# Patient Record
Sex: MALE | Race: WHITE | NOT HISPANIC OR LATINO | URBAN - METROPOLITAN AREA
[De-identification: names, ages, dates, MRNs, and addresses within clinical notes are randomized per-mention and may not be internally consistent; named-entity substitution may affect disease eponyms.]

---

## 2021-02-01 ENCOUNTER — EMERGENCY (EMERGENCY)
Facility: HOSPITAL | Age: 56
LOS: 0 days | Discharge: HOME | End: 2021-02-01
Attending: EMERGENCY MEDICINE | Admitting: EMERGENCY MEDICINE
Payer: MEDICAID

## 2021-02-01 VITALS
HEART RATE: 100 BPM | SYSTOLIC BLOOD PRESSURE: 198 MMHG | OXYGEN SATURATION: 99 % | RESPIRATION RATE: 18 BRPM | DIASTOLIC BLOOD PRESSURE: 99 MMHG | HEIGHT: 69 IN | TEMPERATURE: 99 F | WEIGHT: 182.1 LBS

## 2021-02-01 DIAGNOSIS — Z23 ENCOUNTER FOR IMMUNIZATION: ICD-10-CM
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
## 2021-02-01 DIAGNOSIS — S81.851A OPEN BITE, RIGHT LOWER LEG, INITIAL ENCOUNTER: ICD-10-CM

## 2021-02-01 DIAGNOSIS — Z20.3 CONTACT WITH AND (SUSPECTED) EXPOSURE TO RABIES: ICD-10-CM
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
## 2021-02-01 DIAGNOSIS — Z79.2 LONG TERM (CURRENT) USE OF ANTIBIOTICS: ICD-10-CM

## 2021-02-01 PROCEDURE — 99284 EMERGENCY DEPT VISIT MOD MDM: CPT

## 2021-02-01 RX ORDER — RABIES VACC, HUMAN DIPLOID/PF 2.5 UNIT
1 VIAL (EA) INTRAMUSCULAR ONCE
Refills: 0 | Status: COMPLETED | OUTPATIENT
Start: 2021-02-01 | End: 2021-02-01

## 2021-02-01 RX ORDER — HUMAN RABIES VIRUS IMMUNE GLOBULIN 150 [IU]/ML
1650 INJECTION, SOLUTION INTRAMUSCULAR ONCE
Refills: 0 | Status: COMPLETED | OUTPATIENT
Start: 2021-02-01 | End: 2021-02-01

## 2021-02-01 RX ADMIN — HUMAN RABIES VIRUS IMMUNE GLOBULIN 1650 UNIT(S): 150 INJECTION, SOLUTION INTRAMUSCULAR at 13:22

## 2021-02-01 RX ADMIN — Medication 1 MILLILITER(S): at 13:11

## 2021-02-01 NOTE — ED PROVIDER NOTE - PATIENT PORTAL LINK FT
You can access the FollowMyHealth Patient Portal offered by [facility] by registering at the following website: [email or web address]. By joining [employer]’s FollowMyHealth portal, you will also be able to view your health information using other applications (apps) compatible with our system.

## 2021-02-01 NOTE — ED PROVIDER NOTE - PHYSICAL EXAMINATION
CONSTITUTIONAL: Well-developed; well-nourished; in no acute distress.   SKIN: warm, dry. well-healing punctate lesion on R calf.  HEAD: Normocephalic; atraumatic.  EYES: PERRL, EOMI, normal sclera and conjunctiva   ENT: No nasal discharge; airway clear.  NECK: Supple; non tender.  CARD: S1, S2 normal; no murmurs, gallops, or rubs. Regular rate and rhythm.   RESP: No wheezes, rales or rhonchi.  ABD: soft ntnd  EXT: Normal ROM.  No clubbing, cyanosis or edema.   LYMPH: No acute cervical adenopathy.  NEURO: Alert, oriented, grossly unremarkable  PSYCH: Cooperative, appropriate.

## 2021-02-01 NOTE — ED PROVIDER NOTE - ATTENDING CONTRIBUTION TO CARE
55 year old male, no pmhx, presenting s/p raccoon bite yesterday. States he saw the raccoon bite his right calf region. Denies any other injuries. Since the bite, patient denies any other symptoms including no fevers, rash, difficulty swallowing or leg swelling.    Exam shows well-healing punctate lesion on R calf, no laceration, neurovascularly intact, no fluctuance or induration.    Given raccoon bite, will give rabies prophylaxis, and provide PO abx as prophylaxis as well.

## 2021-02-01 NOTE — ED ADULT TRIAGE NOTE - CHIEF COMPLAINT QUOTE
pt presents for right calf pain after being bit by a  raccoon  yesterday at 1130 am. bleeding controlled.

## 2021-02-01 NOTE — ED PROVIDER NOTE - NS ED ROS FT
Eyes:  No visual changes, eye pain or discharge.  ENMT:  No hearing changes, pain, discharge or infections. No neck pain or stiffness.  Cardiac:  No chest pain, SOB or edema. No chest pain with exertion.  Respiratory:  No cough or respiratory distress. No hemoptysis. No history of asthma or RAD.  GI:  No nausea, vomiting, diarrhea or abdominal pain.  :  No dysuria, frequency or burning.  MS:  No myalgia, muscle weakness, joint pain or back pain.  Neuro:  No headache or weakness.  No LOC.  Skin:  No skin rash. +bite  Endocrine: No history of thyroid disease or diabetes.

## 2021-02-01 NOTE — ED PROVIDER NOTE - OBJECTIVE STATEMENT
pt is a 55 yom w/ no pmhx here for raccoon bite yesterday during the day time. Pt denies fever, rash, inability to tolerate po, swelling

## 2021-02-01 NOTE — ED PROVIDER NOTE - NSFOLLOWUPINSTRUCTIONS_ED_ALL_ED_FT
RABIES VACCINE - General Information     Rabies Vaccine    WHAT YOU NEED TO KNOW:    What is the rabies vaccine? The rabies vaccine is an injection given to help prevent rabies. The rabies virus is spread to humans through the bite of an infected animal. Dogs, bats, skunks, coyotes, raccoons, and foxes are examples of animals that can carry rabies. The rabies vaccine can protect you from being infected with the virus. The vaccine can also prevent you from developing rabies even if you get it after you were bitten by an animal.     When is the vaccine given? Your healthcare provider will tell you how many doses of the vaccine you need. You may need booster shots if you are at high risk for rabies. The following is a common dosing schedule:     Before exposure to the virus, the vaccine is given in 3 doses. The first dose can be given at any time. The second dose is given 7 days after the first. The third dose is given 21 to 28 days after the first. Plan to get all of the doses 3 to 4 weeks before you travel.       After exposure to the virus, the vaccine is given in either 2 or 4 doses:   A person who has not already had the vaccine will usually get 4 doses. The first dose is given immediately. The other doses are given on days 3, 7, and 14 after the exposure. A shot called Rabies Immune Globulin is given at the same time as the first dose. This medicine helps increase your immune system to fight infection.       A person who has already had the vaccine will usually get 2 doses. The first is given immediately, and the second is given on day 3 after the exposure. Rabies Immune Globulin is not given.    Who should get the rabies vaccine?     Anyone who was bitten by an animal that can carry rabies may need the vaccine      Anyone at high risk for rabies, such as people who work with or care for animals or in a rabies laboratory      Anyone who goes into caves where bats live      Anyone who may have had contact with an infected animal      Anyone traveling to another country where rabies is common    Who should not get the rabies vaccine or should wait to get it?     Tell your healthcare provider if you had a severe allergic reaction to a dose of the rabies vaccine in the past, or to other vaccines. If you are getting the vaccine before exposure, do not get another dose. After exposure, you need to get all the doses even if you are at risk for an allergic reaction. Your healthcare provider may need to take extra precautions before you get another dose.      Tell your healthcare provider about all of your allergies. Also tell him if you have a disease that affects your immune system (such as HIV/AIDS) or you have cancer. Tell him if you are taking medicines that affect your immune system or any cancer treatment drug or radiation. Tell him if you are ill. You may need to wait to get the vaccine until you feel better.     What are the risks of the rabies vaccine? You may have a severe allergic reaction. The area where you got the shot may become red, swollen, or painful. You may develop a headache or muscle aches. You may have nausea or pain in your abdomen. You may develop rabies even after you get the vaccine.    Call 911 for any of the following:     Your mouth and throat are swollen.      You are wheezing or have trouble breathing.      You have chest pain or your heart is beating faster than normal for you.      You feel like you are going to faint.    When should I seek immediate care?     Your face is red or swollen.      You have hives that spread over your body.      You feel weak or dizzy.    When should I contact my healthcare provider?     You have increased pain, redness, or swelling around the area where the shot was given.      You have flu-like symptoms.      You have questions or concerns about the rabies vaccine.    CARE AGREEMENT:    You have the right to help plan your care. Learn about your health condition and how it may be treated. Discuss treatment options with your healthcare providers to decide what care you want to receive. You always have the right to refuse treatment.        © Copyright [facility] 2019 All illustrations and images included in CareNotes are the copyrighted property of [employer] or [facility].          Please come back on 2/3, 2/7, 2/14 for subsequent rabies vaccinations as per CDC guidelines.

## 2021-02-01 NOTE — ED PROVIDER NOTE - CLINICAL SUMMARY MEDICAL DECISION MAKING FREE TEXT BOX
Patient presented s/p raccoon bite to right leg. Otherwise afebrile, Hd stable, neurovascularly intact, wound punctate without active signs of infection. Given rabies prophylaxis given high risk exposure, and will provide outpatient abx. Patient agreeable with plan and agrees to return on days 3, 7 and 14 after exposure for repeat rabies vaccine. Ambulatory, tolerates PO. Agrees to return to ED for any new or worsening symptoms.

## 2021-02-04 ENCOUNTER — EMERGENCY (EMERGENCY)
Facility: HOSPITAL | Age: 56
LOS: 0 days | Discharge: HOME | End: 2021-02-04
Attending: EMERGENCY MEDICINE | Admitting: EMERGENCY MEDICINE
Payer: MEDICAID

## 2021-02-04 VITALS
DIASTOLIC BLOOD PRESSURE: 83 MMHG | HEIGHT: 69 IN | RESPIRATION RATE: 20 BRPM | HEART RATE: 97 BPM | SYSTOLIC BLOOD PRESSURE: 183 MMHG | TEMPERATURE: 98 F | OXYGEN SATURATION: 99 %

## 2021-02-04 DIAGNOSIS — Z23 ENCOUNTER FOR IMMUNIZATION: ICD-10-CM
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
## 2021-02-04 DIAGNOSIS — S81.801D UNSPECIFIED OPEN WOUND, RIGHT LOWER LEG, SUBSEQUENT ENCOUNTER: ICD-10-CM

## 2021-02-04 PROCEDURE — 99283 EMERGENCY DEPT VISIT LOW MDM: CPT

## 2021-02-04 RX ORDER — RABIES VACC, HUMAN DIPLOID/PF 2.5 UNIT
1 VIAL (EA) INTRAMUSCULAR ONCE
Refills: 0 | Status: COMPLETED | OUTPATIENT
Start: 2021-02-04 | End: 2021-02-04

## 2021-02-04 RX ADMIN — Medication 1 MILLILITER(S): at 09:03

## 2021-02-04 NOTE — ED PROVIDER NOTE - PHYSICAL EXAMINATION
VSS, awake, alert, in NAD, RLE: healing bite, no signs infection, appears symmetrical, no gross deformity, no skin breakdown, no crepitus, point tenderness, swelling, warmth, erythema, induration, fluctuance, lymphangitis, purulence or lesions, active ROM and passive ROM intact, no joint laxity appreciated, motor and sensation intact distally, 5/5 strength, NV intact distally with 2+ DP pulses, compartments non-tense, pain not out of proportion to exam not appreciated, normal gait.

## 2021-02-04 NOTE — ED PROVIDER NOTE - NSFOLLOWUPINSTRUCTIONS_ED_ALL_ED_FT
RETURN IN 2 WEEKS FOR NEXT DOSE    Rabies Vaccine    WHAT YOU NEED TO KNOW:    What is the rabies vaccine? The rabies vaccine is an injection given to help prevent infection from the virus that causes rabies. The rabies virus is spread to humans through the bite of an infected animal. Dogs, bats, skunks, coyotes, raccoons, and foxes are examples of animals that can carry rabies. The rabies vaccine can protect you from being infected with the virus. The vaccine can also prevent you from developing rabies even if you get it after you were bitten by an infected animal.    When is the vaccine given? Your healthcare provider will tell you how many doses of the vaccine you need. He or she will give you an injection schedule. Plan to get all of the doses on the days they are scheduled, especially the first 2 doses. Do not put off getting the injections or try to schedule them all for the same day. Tell your provider if you think anything may keep you from getting all the doses as scheduled. He or she may be able to help you find ways to stay on schedule. The following is a common dosing schedule:   •Before exposure to the virus, the vaccine is given in 3 doses. The first dose can be given at any time. The second dose is given 7 days after the first. The third dose is given 21 or 28 days after the first. Plan to get all of the doses 3 to 4 weeks before you travel.      •After exposure to the virus, the vaccine is usually given in 2 or 4 doses: ?A person who has not already had the vaccine will usually get 4 doses. The first dose is given as soon after exposure to rabies as possible. A shot called rabies immune globulin is given at the same time as the first dose. This medicine helps your immune system fight the infection. The other doses are given on days 3, 7, and 14 after the exposure. You may also need a dose 28 days after the exposure if you have a weak immune system. Your healthcare provider will tell you if you need 4 or 5 doses.      ?A person who has already had the vaccine will get 2 doses. The first is given immediately, and the second is given on day 3 after the exposure. Rabies immune globulin is not given.      •Booster doses may be needed over time if you stay at high risk for rabies. You are at increased risk for rabies if: ?Your work involves handling animals that can carry rabies.      ?You work in a rabies laboratory.      ?You often go into caves where bats live.      ?You often travel to a country where rabies is common.        What should I do if I miss a dose or will miss a scheduled dose? Call your healthcare provider right away. He or she will tell you what to do. The best way to be protected is to stay on the injection schedule given to you. This is especially important if you are getting the vaccine after exposure to the rabies virus. Reschedule any makeup dose or upcoming dose for as close to the original appointment as possible. Remember that you cannot get more than 1 dose on any day.    Who should not get the rabies vaccine or should wait to get it?   •Tell your healthcare provider if you had a severe allergic reaction to the rabies vaccine or to another vaccine. If you are getting the vaccine before exposure, do not get another dose. After exposure, you need to get all the doses even if you are at risk for an allergic reaction. Your healthcare provider may need to take extra precautions before you get another dose.      •Tell your provider about all of your allergies. Also tell him or her if you have a disease that affects your immune system (such as HIV/AIDS) or you have cancer. Tell him or her if you are taking medicines that affect your immune system or any cancer treatment drug or radiation. Tell him or her if you are ill. You may need to wait to get the vaccine until you feel better.      What are the risks of the rabies vaccine? You may have a severe allergic reaction. The area where you got the shot may become red, swollen, or painful. You may develop a headache or muscle aches. You may have nausea or pain in your abdomen. You may develop rabies even after you get the vaccine.    Call your local emergency number (911 in the [place]) or have someone else call if:   •Your mouth and throat are swollen.      •You are wheezing or have trouble breathing.      •You have chest pain or your heart is beating faster than normal for you.      •You feel like you are going to faint.      When should I seek immediate care?   •Your face is red or swollen.      •You have hives that spread over your body.      •You feel weak or dizzy.      When should I call my doctor?   •You have increased pain, redness, or swelling around the area where the shot was given.      •You have flu-like symptoms.      •You have questions or concerns about the rabies vaccine.      CARE AGREEMENT:    You have the right to help plan your care. Learn about your health condition and how it may be treated. Discuss treatment options with your healthcare providers to decide what care you want to receive. You always have the right to refuse treatment. RETURN NEXT WEEK FOR NEXT DOSE    Rabies Vaccine    WHAT YOU NEED TO KNOW:    What is the rabies vaccine? The rabies vaccine is an injection given to help prevent infection from the virus that causes rabies. The rabies virus is spread to humans through the bite of an infected animal. Dogs, bats, skunks, coyotes, raccoons, and foxes are examples of animals that can carry rabies. The rabies vaccine can protect you from being infected with the virus. The vaccine can also prevent you from developing rabies even if you get it after you were bitten by an infected animal.    When is the vaccine given? Your healthcare provider will tell you how many doses of the vaccine you need. He or she will give you an injection schedule. Plan to get all of the doses on the days they are scheduled, especially the first 2 doses. Do not put off getting the injections or try to schedule them all for the same day. Tell your provider if you think anything may keep you from getting all the doses as scheduled. He or she may be able to help you find ways to stay on schedule. The following is a common dosing schedule:   •Before exposure to the virus, the vaccine is given in 3 doses. The first dose can be given at any time. The second dose is given 7 days after the first. The third dose is given 21 or 28 days after the first. Plan to get all of the doses 3 to 4 weeks before you travel.      •After exposure to the virus, the vaccine is usually given in 2 or 4 doses: ?A person who has not already had the vaccine will usually get 4 doses. The first dose is given as soon after exposure to rabies as possible. A shot called rabies immune globulin is given at the same time as the first dose. This medicine helps your immune system fight the infection. The other doses are given on days 3, 7, and 14 after the exposure. You may also need a dose 28 days after the exposure if you have a weak immune system. Your healthcare provider will tell you if you need 4 or 5 doses.      ?A person who has already had the vaccine will get 2 doses. The first is given immediately, and the second is given on day 3 after the exposure. Rabies immune globulin is not given.      •Booster doses may be needed over time if you stay at high risk for rabies. You are at increased risk for rabies if: ?Your work involves handling animals that can carry rabies.      ?You work in a rabies laboratory.      ?You often go into caves where bats live.      ?You often travel to a country where rabies is common.        What should I do if I miss a dose or will miss a scheduled dose? Call your healthcare provider right away. He or she will tell you what to do. The best way to be protected is to stay on the injection schedule given to you. This is especially important if you are getting the vaccine after exposure to the rabies virus. Reschedule any makeup dose or upcoming dose for as close to the original appointment as possible. Remember that you cannot get more than 1 dose on any day.    Who should not get the rabies vaccine or should wait to get it?   •Tell your healthcare provider if you had a severe allergic reaction to the rabies vaccine or to another vaccine. If you are getting the vaccine before exposure, do not get another dose. After exposure, you need to get all the doses even if you are at risk for an allergic reaction. Your healthcare provider may need to take extra precautions before you get another dose.      •Tell your provider about all of your allergies. Also tell him or her if you have a disease that affects your immune system (such as HIV/AIDS) or you have cancer. Tell him or her if you are taking medicines that affect your immune system or any cancer treatment drug or radiation. Tell him or her if you are ill. You may need to wait to get the vaccine until you feel better.      What are the risks of the rabies vaccine? You may have a severe allergic reaction. The area where you got the shot may become red, swollen, or painful. You may develop a headache or muscle aches. You may have nausea or pain in your abdomen. You may develop rabies even after you get the vaccine.    Call your local emergency number (911 in the [place]) or have someone else call if:   •Your mouth and throat are swollen.      •You are wheezing or have trouble breathing.      •You have chest pain or your heart is beating faster than normal for you.      •You feel like you are going to faint.      When should I seek immediate care?   •Your face is red or swollen.      •You have hives that spread over your body.      •You feel weak or dizzy.      When should I call my doctor?   •You have increased pain, redness, or swelling around the area where the shot was given.      •You have flu-like symptoms.      •You have questions or concerns about the rabies vaccine.      CARE AGREEMENT:    You have the right to help plan your care. Learn about your health condition and how it may be treated. Discuss treatment options with your healthcare providers to decide what care you want to receive. You always have the right to refuse treatment.

## 2021-02-04 NOTE — ED PROVIDER NOTE - PATIENT PORTAL LINK FT
You can access the FollowMyHealth Patient Portal offered by [facility] by registering at the following website: [email or web address]. By joining [employer]’s FollowMyHealth portal, you will also be able to view your health information using other applications (apps) compatible with our system.

## 2021-02-08 ENCOUNTER — EMERGENCY (EMERGENCY)
Facility: HOSPITAL | Age: 56
LOS: 0 days | Discharge: HOME | End: 2021-02-08
Attending: EMERGENCY MEDICINE | Admitting: EMERGENCY MEDICINE
Payer: MEDICAID

## 2021-02-08 VITALS
WEIGHT: 182.1 LBS | TEMPERATURE: 97 F | OXYGEN SATURATION: 100 % | RESPIRATION RATE: 18 BRPM | DIASTOLIC BLOOD PRESSURE: 92 MMHG | HEART RATE: 80 BPM | HEIGHT: 69 IN | SYSTOLIC BLOOD PRESSURE: 173 MMHG

## 2021-02-08 DIAGNOSIS — Z23 ENCOUNTER FOR IMMUNIZATION: ICD-10-CM

## 2021-02-08 DIAGNOSIS — Z20.3 CONTACT WITH AND (SUSPECTED) EXPOSURE TO RABIES: ICD-10-CM

## 2021-02-08 PROBLEM — Z78.9 OTHER SPECIFIED HEALTH STATUS: Chronic | Status: ACTIVE | Noted: 2021-02-07

## 2021-02-08 PROCEDURE — 99283 EMERGENCY DEPT VISIT LOW MDM: CPT

## 2021-02-08 RX ORDER — RABIES VACC, HUMAN DIPLOID/PF 2.5 UNIT
1 VIAL (EA) INTRAMUSCULAR ONCE
Refills: 0 | Status: COMPLETED | OUTPATIENT
Start: 2021-02-08 | End: 2021-02-08

## 2021-02-08 RX ADMIN — Medication 1 MILLILITER(S): at 08:45

## 2021-02-08 NOTE — ED PROVIDER NOTE - PHYSICAL EXAMINATION
Constitutional: Well developed, well nourished. NAD.  Head: Normocephalic, atraumatic.  Eyes: PERRL.  ENT: Mucous membranes moist.  Cardiovascular: Normal S1, S2. Regular rate and rhythm. No murmurs, rubs, or gallops.  Pulmonary: Normal respiratory rate and effort. Lungs clear to auscultation bilaterally. No wheezing, rales, or rhonchi.  Neuro: AAOx3. No focal neurological deficits.  Psych: Normal mood. Normal affect.  Skin: well healing wound on RLE, with mild erythema. no drainage

## 2021-02-08 NOTE — ED PROVIDER NOTE - OBJECTIVE STATEMENT
pt is a 55 yom w/ no pmhx here for day 7 rabies vaccine. Bite was on 1/31, pt got first shot on 2/1, second shot on 2/4, and here for third shot today. no fever, chills, drooling

## 2021-02-08 NOTE — ED PROVIDER NOTE - NSFOLLOWUPINSTRUCTIONS_ED_ALL_ED_FT
Please return to ED on 2/15 for final shot.       RABIES VACCINE - General Information     Rabies Vaccine    WHAT YOU NEED TO KNOW:    What is the rabies vaccine? The rabies vaccine is an injection given to help prevent rabies. The rabies virus is spread to humans through the bite of an infected animal. Dogs, bats, skunks, coyotes, raccoons, and foxes are examples of animals that can carry rabies. The rabies vaccine can protect you from being infected with the virus. The vaccine can also prevent you from developing rabies even if you get it after you were bitten by an animal.     When is the vaccine given? Your healthcare provider will tell you how many doses of the vaccine you need. You may need booster shots if you are at high risk for rabies. The following is a common dosing schedule:     Before exposure to the virus, the vaccine is given in 3 doses. The first dose can be given at any time. The second dose is given 7 days after the first. The third dose is given 21 to 28 days after the first. Plan to get all of the doses 3 to 4 weeks before you travel.       After exposure to the virus, the vaccine is given in either 2 or 4 doses:   A person who has not already had the vaccine will usually get 4 doses. The first dose is given immediately. The other doses are given on days 3, 7, and 14 after the exposure. A shot called Rabies Immune Globulin is given at the same time as the first dose. This medicine helps increase your immune system to fight infection.       A person who has already had the vaccine will usually get 2 doses. The first is given immediately, and the second is given on day 3 after the exposure. Rabies Immune Globulin is not given.    Who should get the rabies vaccine?     Anyone who was bitten by an animal that can carry rabies may need the vaccine      Anyone at high risk for rabies, such as people who work with or care for animals or in a rabies laboratory      Anyone who goes into caves where bats live      Anyone who may have had contact with an infected animal      Anyone traveling to another country where rabies is common    Who should not get the rabies vaccine or should wait to get it?     Tell your healthcare provider if you had a severe allergic reaction to a dose of the rabies vaccine in the past, or to other vaccines. If you are getting the vaccine before exposure, do not get another dose. After exposure, you need to get all the doses even if you are at risk for an allergic reaction. Your healthcare provider may need to take extra precautions before you get another dose.      Tell your healthcare provider about all of your allergies. Also tell him if you have a disease that affects your immune system (such as HIV/AIDS) or you have cancer. Tell him if you are taking medicines that affect your immune system or any cancer treatment drug or radiation. Tell him if you are ill. You may need to wait to get the vaccine until you feel better.     What are the risks of the rabies vaccine? You may have a severe allergic reaction. The area where you got the shot may become red, swollen, or painful. You may develop a headache or muscle aches. You may have nausea or pain in your abdomen. You may develop rabies even after you get the vaccine.    Call 911 for any of the following:     Your mouth and throat are swollen.      You are wheezing or have trouble breathing.      You have chest pain or your heart is beating faster than normal for you.      You feel like you are going to faint.    When should I seek immediate care?     Your face is red or swollen.      You have hives that spread over your body.      You feel weak or dizzy.    When should I contact my healthcare provider?     You have increased pain, redness, or swelling around the area where the shot was given.      You have flu-like symptoms.      You have questions or concerns about the rabies vaccine.    CARE AGREEMENT:    You have the right to help plan your care. Learn about your health condition and how it may be treated. Discuss treatment options with your healthcare providers to decide what care you want to receive. You always have the right to refuse treatment.        © Copyright [employer] 2019 All illustrations and images included in CareNotes are the copyrighted property of [employer] or [employer].

## 2021-02-08 NOTE — ED PROVIDER NOTE - PATIENT PORTAL LINK FT
You can access the FollowMyHealth Patient Portal offered by [facility] by registering at the following website: [email or web address]. By joining [employer]’s FollowMyHealth portal, you will also be able to view your health information using other applications (apps) compatible with our system.

## 2021-02-15 ENCOUNTER — EMERGENCY (EMERGENCY)
Facility: HOSPITAL | Age: 56
LOS: 0 days | Discharge: HOME | End: 2021-02-15
Attending: EMERGENCY MEDICINE | Admitting: EMERGENCY MEDICINE
Payer: MEDICAID

## 2021-02-15 VITALS
TEMPERATURE: 98 F | WEIGHT: 189.6 LBS | SYSTOLIC BLOOD PRESSURE: 162 MMHG | OXYGEN SATURATION: 99 % | RESPIRATION RATE: 18 BRPM | HEART RATE: 78 BPM | DIASTOLIC BLOOD PRESSURE: 80 MMHG | HEIGHT: 69 IN

## 2021-02-15 DIAGNOSIS — Z23 ENCOUNTER FOR IMMUNIZATION: ICD-10-CM

## 2021-02-15 PROCEDURE — 99282 EMERGENCY DEPT VISIT SF MDM: CPT

## 2021-02-15 RX ORDER — RABIES VACC, HUMAN DIPLOID/PF 2.5 UNIT
1 VIAL (EA) INTRAMUSCULAR ONCE
Refills: 0 | Status: COMPLETED | OUTPATIENT
Start: 2021-02-15 | End: 2021-02-15

## 2021-02-15 RX ADMIN — Medication 1 MILLILITER(S): at 09:08

## 2021-02-15 NOTE — ED PROVIDER NOTE - NSFOLLOWUPINSTRUCTIONS_ED_ALL_ED_FT
RABIES VACCINE - General Information     Rabies Vaccine    WHAT YOU NEED TO KNOW:    What is the rabies vaccine? The rabies vaccine is an injection given to help prevent rabies. The rabies virus is spread to humans through the bite of an infected animal. Dogs, bats, skunks, coyotes, raccoons, and foxes are examples of animals that can carry rabies. The rabies vaccine can protect you from being infected with the virus. The vaccine can also prevent you from developing rabies even if you get it after you were bitten by an animal.     When is the vaccine given? Your healthcare provider will tell you how many doses of the vaccine you need. You may need booster shots if you are at high risk for rabies. The following is a common dosing schedule:     Before exposure to the virus, the vaccine is given in 3 doses. The first dose can be given at any time. The second dose is given 7 days after the first. The third dose is given 21 to 28 days after the first. Plan to get all of the doses 3 to 4 weeks before you travel.       After exposure to the virus, the vaccine is given in either 2 or 4 doses:   A person who has not already had the vaccine will usually get 4 doses. The first dose is given immediately. The other doses are given on days 3, 7, and 14 after the exposure. A shot called Rabies Immune Globulin is given at the same time as the first dose. This medicine helps increase your immune system to fight infection.       A person who has already had the vaccine will usually get 2 doses. The first is given immediately, and the second is given on day 3 after the exposure. Rabies Immune Globulin is not given.    Who should get the rabies vaccine?     Anyone who was bitten by an animal that can carry rabies may need the vaccine      Anyone at high risk for rabies, such as people who work with or care for animals or in a rabies laboratory      Anyone who goes into caves where bats live      Anyone who may have had contact with an infected animal      Anyone traveling to another country where rabies is common    Who should not get the rabies vaccine or should wait to get it?     Tell your healthcare provider if you had a severe allergic reaction to a dose of the rabies vaccine in the past, or to other vaccines. If you are getting the vaccine before exposure, do not get another dose. After exposure, you need to get all the doses even if you are at risk for an allergic reaction. Your healthcare provider may need to take extra precautions before you get another dose.      Tell your healthcare provider about all of your allergies. Also tell him if you have a disease that affects your immune system (such as HIV/AIDS) or you have cancer. Tell him if you are taking medicines that affect your immune system or any cancer treatment drug or radiation. Tell him if you are ill. You may need to wait to get the vaccine until you feel better.     What are the risks of the rabies vaccine? You may have a severe allergic reaction. The area where you got the shot may become red, swollen, or painful. You may develop a headache or muscle aches. You may have nausea or pain in your abdomen. You may develop rabies even after you get the vaccine.    Call 911 for any of the following:     Your mouth and throat are swollen.      You are wheezing or have trouble breathing.      You have chest pain or your heart is beating faster than normal for you.      You feel like you are going to faint.    When should I seek immediate care?     Your face is red or swollen.      You have hives that spread over your body.      You feel weak or dizzy.    When should I contact my healthcare provider?     You have increased pain, redness, or swelling around the area where the shot was given.      You have flu-like symptoms.      You have questions or concerns about the rabies vaccine.    CARE AGREEMENT:    You have the right to help plan your care. Learn about your health condition and how it may be treated. Discuss treatment options with your healthcare providers to decide what care you want to receive. You always have the right to refuse treatment.        © Copyright [employer] 2019 All illustrations and images included in CareNotes are the copyrighted property of [employer] or [employer].

## 2021-02-15 NOTE — ED PROVIDER NOTE - OBJECTIVE STATEMENT
54 y/o male presents to the ED for 4th rabies vaccine after being bit by raccoon 2 weeks ago. offers no complaints

## 2021-02-15 NOTE — ED PROVIDER NOTE - PATIENT PORTAL LINK FT
You can access the FollowMyHealth Patient Portal offered by [facility] by registering at the following website: [email or web address]. By joining [employer]’s FollowMyHealth portal, you will also be able to view your health information using other applications (apps) compatible with our system.

## 2021-02-15 NOTE — ED PROVIDER NOTE - CLINICAL SUMMARY MEDICAL DECISION MAKING FREE TEXT BOX
Patient presented for 4th dose of rabies vaccine s/p raccoon bite 2 weeks ago. Otherwise afebrile, HD stable, no other complaints. Wound well healed on exam, no signs of infection. Provided rabies vaccine in ED. Will discharge. Patient agreeable with plan. Agrees to return to ED for any new or worsening symptoms.

## 2021-02-15 NOTE — ED PROVIDER NOTE - ATTENDING CONTRIBUTION TO CARE
55 year old male, no pmhx, presenting for 4th rabies vaccine s/p being bitten by raccoon 2 weeks ago. No other complaints. States wound on right leg is healed.    Exam of wound shows no signs of infection, well healed.    Will provide dose of rabies vaccine, discharge.
